# Patient Record
Sex: FEMALE | URBAN - METROPOLITAN AREA
[De-identification: names, ages, dates, MRNs, and addresses within clinical notes are randomized per-mention and may not be internally consistent; named-entity substitution may affect disease eponyms.]

---

## 2024-11-25 ENCOUNTER — APPOINTMENT (RX ONLY)
Age: 54
Setting detail: DERMATOLOGY
End: 2024-11-25

## 2024-11-25 DIAGNOSIS — L65.9 NONSCARRING HAIR LOSS, UNSPECIFIED: ICD-10-CM

## 2024-11-25 DIAGNOSIS — L60.8 OTHER NAIL DISORDERS: ICD-10-CM

## 2024-11-25 DIAGNOSIS — L03.01 CELLULITIS OF FINGER: ICD-10-CM

## 2024-11-25 PROBLEM — L03.012 CELLULITIS OF LEFT FINGER: Status: ACTIVE | Noted: 2024-11-25

## 2024-11-25 PROBLEM — L03.011 CELLULITIS OF RIGHT FINGER: Status: ACTIVE | Noted: 2024-11-25

## 2024-11-25 PROCEDURE — 99203 OFFICE O/P NEW LOW 30 MIN: CPT

## 2024-11-25 PROCEDURE — ? PRESCRIPTION

## 2024-11-25 PROCEDURE — ? ORDER TESTS

## 2024-11-25 PROCEDURE — ? PATIENT SPECIFIC COUNSELING

## 2024-11-25 PROCEDURE — ? ADDITIONAL NOTES

## 2024-11-25 PROCEDURE — ? COUNSELING

## 2024-11-25 PROCEDURE — ? DIAGNOSIS COMMENT

## 2024-11-25 RX ADMIN — BETAMETHASONE DIPROPIONATE: 0.5 CREAM, AUGMENTED TOPICAL at 00:00

## 2024-11-25 ASSESSMENT — LOCATION SIMPLE DESCRIPTION DERM
LOCATION SIMPLE: SCALP
LOCATION SIMPLE: RIGHT INDEX FINGERNAIL
LOCATION SIMPLE: LEFT INDEX FINGER
LOCATION SIMPLE: LEFT INDEX FINGERNAIL

## 2024-11-25 ASSESSMENT — LOCATION ZONE DERM
LOCATION ZONE: SCALP
LOCATION ZONE: FINGER
LOCATION ZONE: FINGERNAIL

## 2024-11-25 ASSESSMENT — LOCATION DETAILED DESCRIPTION DERM
LOCATION DETAILED: LEFT INDEX FINGERNAIL
LOCATION DETAILED: RIGHT INDEX FINGERNAIL
LOCATION DETAILED: RIGHT CENTRAL PARIETAL SCALP
LOCATION DETAILED: LEFT DISTAL DORSAL INDEX FINGER

## 2024-11-25 NOTE — PROCEDURE: ADDITIONAL NOTES
Additional Notes: Hair loss began 3-4 years ago. 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 She washes her hair qod, does not blow dry, and has an occasional mild itch. \\n\\nNo family hx of hairloss except mom's hair is thinning with age and dad is balding. \\n\\nPt had a hysterectomy years ago. Hx of cervical cancer 16 years ago. Hx of iron deficiency (anemia) which is controlled with diet. She donates blood. Hx of migraines for years.
Render Risk Assessment In Note?: no
Detail Level: Simple
Additional Notes: Nails brittle and have longitudinal ridging. and splitting for years. She has some paronychia and loss of cuticles. This may be exacerbated from almost constant glove wearing at work.

## 2024-11-25 NOTE — PROCEDURE: ORDER TESTS
Performing Laboratory: [id number]
Lab Facility: [id number]
Expected Date Of Service: 11/25/2024
Bill For Surgical Tray: no
Billing Type: Third-Party Bill

## 2024-11-25 NOTE — PROCEDURE: PATIENT SPECIFIC COUNSELING
protect hands from irritants when possible\\nTry betamethasone cream\\nConsider sodium sulfacetamide in ETOH if not improved
Detail Level: Zone
discussed ddx at some length. Recommended checking labs. Discussed oral and topical minoxidil. F/u depending on labs and if hair loss progresses (not severe clinically).

## 2024-11-25 NOTE — PROCEDURE: DIAGNOSIS COMMENT
Detail Level: Simple
Comment: Thinning of scalp hair, more on right side of scalp. Left eyebrow is thicker than the right eyebrow hair. [place] of hair loss in the pubic area.\\nNo definitive distinct patches of hair loss in scalp\\nddx: chronic TE vs diffuse alopecia areata vs anedrogentic alopecia
Render Risk Assessment In Note?: no
Comment: periungual erythema and loss of cuticles
Detail Level: Zone

## 2024-11-26 RX ORDER — BETAMETHASONE DIPROPIONATE 0.5 MG/G
CREAM, AUGMENTED TOPICAL
Qty: 15 | Refills: 1 | Status: ERX | COMMUNITY
Start: 2024-11-25